# Patient Record
Sex: MALE | Race: WHITE | Employment: STUDENT | URBAN - METROPOLITAN AREA
[De-identification: names, ages, dates, MRNs, and addresses within clinical notes are randomized per-mention and may not be internally consistent; named-entity substitution may affect disease eponyms.]

---
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
## 2020-11-12 ENCOUNTER — TELEMEDICINE (OUTPATIENT)
Dept: FAMILY MEDICINE CLINIC | Facility: CLINIC | Age: 20
End: 2020-11-12
Payer: COMMERCIAL

## 2020-11-12 DIAGNOSIS — M79.10 MYALGIA: ICD-10-CM

## 2020-11-12 DIAGNOSIS — R43.2 LOSS OF TASTE: Primary | ICD-10-CM

## 2020-11-12 DIAGNOSIS — R43.0 LOSS OF SMELL: ICD-10-CM

## 2020-11-12 DIAGNOSIS — R09.81 SINUS CONGESTION: ICD-10-CM

## 2020-11-12 PROCEDURE — 99213 OFFICE O/P EST LOW 20 MIN: CPT | Performed by: PHYSICIAN ASSISTANT

## 2020-11-12 NOTE — PROGRESS NOTES
This visit is conducted using Telemedicine with live, interactive video and audio. Patient has been referred to the [facility] website at [email or web address] to review the yearly Consent to Treat document.     Patient understands and accepts financial res headaches. Negative for dizziness and light-headedness. There were no vitals taken for this visit. There is no height or weight on file to calculate BMI.   Physical Exam   Constitutional: He is oriented to person, place, and time and well-developed, to test specimen collection) should quarantine for 14 days REGARDLESS of testing.   Testing is recommended for asymptomatic close contacts at days 5-7 post-exposure, as a negative test result may help clear an individual from the recommended quarantine dede provider's office take necessary precautions to keep other people from getting infected or exposed.   Wear a facemask  You should wear a facemask when you are around other people (e.g., sharing a room or vehicle) or pets and before you enter a healthcare pr to the label instructions.  Labels contain instructions for safe and effective use of the cleaning product including precautions you should take when applying the product, such as wearing gloves and making sure you have good ventilation during use of the pr have a medical appointment, call the healthcare provider ahead of time and tell them that you have or may have COVID-19. • For medical emergencies, call 911 and notify the dispatch personnel that you have or may have COVID-19.   • Cover your mouth and nose

## 2020-11-12 NOTE — PATIENT INSTRUCTIONS
Patient Isolation Advice:  • Those with mild symptoms*, are presumed to have COVID unless they test negative and have been cleared by their physician and / or an alternate diagnosis to explain their symptoms has been established.   • Those with mild symptom smell, skin rash or discoloration of fingers or toes. **Severe symptoms include: Difficulty breathing or shortness of breath, chest pain or pressure. office. What to do if you are sick with coronavirus disease 2019 (COVID-19):   If you are sick with COV your hands and rubbing them together until they feel dry. Soap and water should be used preferentially if hands are visibly dirty.     Avoid sharing personal household items  You should not share dishes, drink glasses, cups, eating utensils, towels, or bedd self-monitoring should follow instructions provided by their local health department or occupational health professionals, as appropriate.   If you have a medical emergency and need to call 911, notify the dispatch personnel that you have, or are being eval counters, tabletops, and doorknobs. Use household cleaning sprays or wipes according to the label instructions.   Centers for Disease Control & Prevention (CDC)  What to do if you are sick with coronavirus disease 2019, [employer]-

## 2020-11-13 ENCOUNTER — LAB ENCOUNTER (OUTPATIENT)
Dept: LAB | Age: 20
End: 2020-11-13
Attending: PHYSICIAN ASSISTANT
Payer: COMMERCIAL

## 2020-11-13 DIAGNOSIS — Z20.828 EXPOSURE TO SARS-ASSOCIATED CORONAVIRUS: ICD-10-CM

## 2020-11-16 NOTE — PROGRESS NOTES
+ COVID  Pt needs to quarantine    Patient Isolation Advice:   Those with mild symptoms*, are presumed to have COVID unless they test negative and have been cleared by their physician and / or an alternate diagnosis to explain their symptoms has been establ caregiving activities. What to do if you are sick with coronavirus disease 2019 (COVID-19):   If you are sick with COVID-19, or suspect you are infected with the virus that causes COVID-19, follow the steps below to help prevent the disease from spreadin dirty. Avoid sharing personal household items  You should not share dishes, drink glasses, cups, eating utensils, towels, or bedding with other people or pets in your home. After using these items, they should be washed thoroughly with soap and water. professionals, as appropriate. If you have a medical emergency and need to call 911, notify the dispatch personnel that you have, or are being evaluated for COVID-19. If possible, put on a facemask before emergency medical services personnel arrive. Disease Control & Prevention (CDC)  What to do if you are sick with coronavirus disease 2019, [employer].IntelliDOT.pt. pdf  Centers for Disease Control & Prevention (CDC)  10 things you can do to ma

## 2020-12-02 ENCOUNTER — TELEPHONE (OUTPATIENT)
Dept: FAMILY MEDICINE CLINIC | Facility: CLINIC | Age: 20
End: 2020-12-02

## 2020-12-02 NOTE — TELEPHONE ENCOUNTER
Needs a note for work stating when he had Chinyere and also states how long he needed to quarantine for.

## 2021-11-28 ENCOUNTER — HOSPITAL ENCOUNTER (EMERGENCY)
Age: 21
Discharge: HOME | End: 2021-11-28
Attending: EMERGENCY MEDICINE
Payer: COMMERCIAL

## 2021-11-28 ENCOUNTER — APPOINTMENT (OUTPATIENT)
Dept: GENERAL RADIOLOGY | Age: 21
End: 2021-11-28
Attending: EMERGENCY MEDICINE
Payer: COMMERCIAL

## 2021-11-28 VITALS
TEMPERATURE: 98 F | WEIGHT: 200 LBS | HEART RATE: 78 BPM | OXYGEN SATURATION: 98 % | HEIGHT: 72 IN | SYSTOLIC BLOOD PRESSURE: 137 MMHG | DIASTOLIC BLOOD PRESSURE: 67 MMHG | BODY MASS INDEX: 27.09 KG/M2 | RESPIRATION RATE: 16 BRPM

## 2021-11-28 DIAGNOSIS — R07.89 CHEST PAIN, NON-CARDIAC: Primary | ICD-10-CM

## 2021-11-28 PROCEDURE — 93005 ELECTROCARDIOGRAM TRACING: CPT

## 2021-11-28 PROCEDURE — 85379 FIBRIN DEGRADATION QUANT: CPT | Performed by: EMERGENCY MEDICINE

## 2021-11-28 PROCEDURE — 93010 ELECTROCARDIOGRAM REPORT: CPT

## 2021-11-28 PROCEDURE — 85025 COMPLETE CBC W/AUTO DIFF WBC: CPT | Performed by: EMERGENCY MEDICINE

## 2021-11-28 PROCEDURE — 80053 COMPREHEN METABOLIC PANEL: CPT | Performed by: EMERGENCY MEDICINE

## 2021-11-28 PROCEDURE — 36415 COLL VENOUS BLD VENIPUNCTURE: CPT

## 2021-11-28 PROCEDURE — 99284 EMERGENCY DEPT VISIT MOD MDM: CPT

## 2021-11-28 PROCEDURE — 84484 ASSAY OF TROPONIN QUANT: CPT | Performed by: EMERGENCY MEDICINE

## 2021-11-28 PROCEDURE — 71045 X-RAY EXAM CHEST 1 VIEW: CPT | Performed by: EMERGENCY MEDICINE

## 2021-11-28 NOTE — ED PROVIDER NOTES
Patient Seen in: THE [facility] Emergency Department In [place]      History   Patient presents with:  Chest Pain Angina    Stated Complaint: chest tightness    Subjective:   HPI    Patient is a 80-year-old male presents emergency room with a history of inter Ht 182.9 cm (6')   Wt 90.7 kg   SpO2 97%   BMI 27.12 kg/m²         Physical Exam  GENERAL: Well-developed, well-nourished male sitting up breathing easily in no apparent distress. Patient is nontoxic in appearance.   HEENT: Head is normocephalic, atraumati CBC W/ DIFFERENTIAL     EKG    Rate, intervals and axes as noted on EKG Report. Rate: 79  Rhythm: Sinus Rhythm  Reading: No acute ischemic change noted              Chest x-ray is unremarkable.          MDM          1:30 patient sitting back and breathin [id number] [phone]    Call in 2 days            Medications Prescribed:  There are no discharge medications for this patient.

## 2021-11-28 NOTE — ED INITIAL ASSESSMENT (HPI)
C/O intermittent L chest pain x 6 months. States Last Sunday began having pain L neck, L eye, and L arm.

## 2021-12-14 ENCOUNTER — OFFICE VISIT (OUTPATIENT)
Dept: FAMILY MEDICINE CLINIC | Facility: CLINIC | Age: 21
End: 2021-12-14
Payer: COMMERCIAL

## 2021-12-14 VITALS
WEIGHT: 196 LBS | TEMPERATURE: 98 F | HEIGHT: 72 IN | HEART RATE: 76 BPM | OXYGEN SATURATION: 96 % | BODY MASS INDEX: 26.55 KG/M2 | DIASTOLIC BLOOD PRESSURE: 72 MMHG | RESPIRATION RATE: 16 BRPM | SYSTOLIC BLOOD PRESSURE: 120 MMHG

## 2021-12-14 DIAGNOSIS — R07.9 CHEST PAIN, UNSPECIFIED TYPE: Primary | ICD-10-CM

## 2021-12-14 DIAGNOSIS — F41.9 ANXIETY: ICD-10-CM

## 2021-12-14 PROCEDURE — 99214 OFFICE O/P EST MOD 30 MIN: CPT | Performed by: FAMILY MEDICINE

## 2021-12-14 PROCEDURE — 3008F BODY MASS INDEX DOCD: CPT | Performed by: FAMILY MEDICINE

## 2021-12-14 PROCEDURE — 3074F SYST BP LT 130 MM HG: CPT | Performed by: FAMILY MEDICINE

## 2021-12-14 PROCEDURE — 3078F DIAST BP <80 MM HG: CPT | Performed by: FAMILY MEDICINE

## 2021-12-14 RX ORDER — FLUOXETINE 10 MG/1
10 TABLET, FILM COATED ORAL DAILY
Qty: 30 TABLET | Refills: 1 | Status: SHIPPED | OUTPATIENT
Start: 2021-12-14 | End: 2022-01-11

## 2021-12-14 NOTE — PROGRESS NOTES
Subjective:   Patient ID: Dorina Diana is a 24year old male.     HPI  Mr. Laxmi Kasper is a very pleasant 26-year-old gentleman who is generally healthy who was seen at the emergency room on 11/28/2021 for left-sided chest pain apparently he has had this fo sounds. No murmur heard. Pulmonary:      Effort: Pulmonary effort is normal. No respiratory distress. Breath sounds: Normal breath sounds. No wheezing or rales. Chest:      Chest wall: No tenderness.    Abdominal:      General: Bowel sounds are

## 2021-12-14 NOTE — PATIENT INSTRUCTIONS
Thank you for choosing Torrie Henderson MD at [place] [address]  To Do: Awilda Bruce  1. Please take meds as directed. [facility] is located in [address]. Monday, Tuesday & Friday – 8 a.m. to 4 p.m.   Wednesday, Thursday – 7 a.m. to 3 p.m those potential risks and we strive to make you healthier and to improve your quality of life.     Referrals, and Radiology Information:    If your insurance requires a referral to a specialist, please allow 5 business days to process your referral request. separate you from a loved one, or lose your job. The symptoms of anxiety can be physical and mental.   How does it feel?   People with anxiety may have:  · Dizziness  · Muscle tension or pain  · Restlessness  · Sleeplessness  · Trouble focusing  · Racing he do to cope:  · Do what you can. Keep in mind that you can’t control everything. Change what you can. And let the rest take its course. · Exercise. This is a great way to ease tension and help your body feel relaxed.   · Stay away from caffeine and nicotin pain)  · Inflamed cartilage between the ribs (costochondritis)  · Fibromyalgia  · Rheumatoid arthritis  · Chest wall strain  · Reflux  · Stomach ulcer  · Spasms of the esophagus  · Gall stones  · Gallbladder inflammation  · Panic or anxiety attacks  · Emot

## 2022-01-11 NOTE — PROGRESS NOTES
Subjective:   Patient ID: Bob Dennis is a 24year old male. HPI  Mr. Ananda Christie is a pleasant 40-year-old gentleman here to follow-up for stress. I did start him on fluoxetine but it made him more anxious.   He had taken some days off and is feeling rales.   Abdominal:      General: Bowel sounds are normal.      Palpations: Abdomen is soft. Musculoskeletal:      Cervical back: Neck supple. Right lower leg: No edema. Left lower leg: No edema.    Lymphadenopathy:      Cervical: No cervical ad

## 2022-01-11 NOTE — PATIENT INSTRUCTIONS
Thank you for choosing Clarissa Box MD at [place] [address]  To Do: Vitaly Chavira  1. Please take meds as directed. [facility] is located in [address]. Monday, Tuesday & Friday – 8 a.m. to 4 p.m.   Wednesday, Thursday – 7 a.m. to 3 p.m those potential risks and we strive to make you healthier and to improve your quality of life.     Referrals, and Radiology Information:    If your insurance requires a referral to a specialist, please allow 5 business days to process your referral request.

## 2022-01-12 ENCOUNTER — LAB ENCOUNTER (OUTPATIENT)
Dept: LAB | Age: 22
End: 2022-01-12
Attending: FAMILY MEDICINE
Payer: COMMERCIAL

## 2022-01-12 DIAGNOSIS — R09.81 HEAD CONGESTION: ICD-10-CM

## 2022-01-14 LAB — SARS-COV-2 RNA RESP QL NAA+PROBE: NOT DETECTED

## 2022-10-10 ENCOUNTER — HOSPITAL ENCOUNTER (EMERGENCY)
Age: 22
Discharge: HOME | End: 2022-10-10
Attending: EMERGENCY MEDICINE
Payer: COMMERCIAL

## 2022-10-10 VITALS
SYSTOLIC BLOOD PRESSURE: 124 MMHG | RESPIRATION RATE: 18 BRPM | HEIGHT: 72 IN | TEMPERATURE: 99 F | HEART RATE: 74 BPM | DIASTOLIC BLOOD PRESSURE: 71 MMHG | WEIGHT: 200 LBS | BODY MASS INDEX: 27.09 KG/M2 | OXYGEN SATURATION: 98 %

## 2022-10-10 DIAGNOSIS — J06.9 VIRAL UPPER RESPIRATORY TRACT INFECTION: Primary | ICD-10-CM

## 2022-10-10 LAB — SARS-COV-2 RNA RESP QL NAA+PROBE: NOT DETECTED

## 2022-10-10 PROCEDURE — 99283 EMERGENCY DEPT VISIT LOW MDM: CPT

## 2022-10-10 RX ORDER — ALBUTEROL SULFATE 90 UG/1
2 AEROSOL, METERED RESPIRATORY (INHALATION) EVERY 4 HOURS PRN
Qty: 1 EACH | Refills: 0 | Status: SHIPPED | OUTPATIENT
Start: 2022-10-10 | End: 2022-11-09

## 2022-10-10 NOTE — ED INITIAL ASSESSMENT (HPI)
Pt presents to ed with c/o cough for the past week and intermittent shortness of breath. Denies fevers.

## 2024-12-27 ENCOUNTER — TELEPHONE (OUTPATIENT)
Dept: FAMILY MEDICINE CLINIC | Facility: CLINIC | Age: 24
End: 2024-12-27

## 2024-12-27 NOTE — TELEPHONE ENCOUNTER
Future Appointments   Date Time Provider Department Center   1/7/2025  3:45 PM Mynor Zapata MD [facility] [address] [facility] [address]     Patient scheduled via MyChart for:I have been getting shooting pain headaches almost every day and the occasional tightness in chest. Please advise

## 2024-12-27 NOTE — TELEPHONE ENCOUNTER
Spoke to patient.   Patient states he currently does not have symptoms. Scheduled sooner appt for Monday.   Advised to go to ER is symptoms continue/worsen. Patient verbalized understanding and thankful for call.     Future Appointments   Date Time Provider Department Center   12/30/2024  2:40 PM Roscoe Salmon APRN [facility] [address] [facility] [address]

## 2024-12-27 NOTE — TELEPHONE ENCOUNTER
Patient called back.   States sx started beginning of this week ~5 days ago.  Has had episodes of shooting pain headache with chest tightness that last 5 minutes.   Has had a couple episodes a day but occur mostly at night.   Has had intermittent dizziness and SOB during some episodes.   Last episode occurred last night.     Was sick with a head cold/congestion about 2 weeks.   Has not had more stress than usual.     Currently denies any symptoms.   Advised patient to go to ER if sx worsen or has chest pain/sob. Keep hydrated. Patient verbalized understanding.     Ok to use double book at 3 pm on Monday 12/30? Patient is unavailable in morning.

## 2024-12-27 NOTE — TELEPHONE ENCOUNTER
Might be good to have him evaluated through the emergency room.  I have not seen him in about 3 years.

## 2025-01-10 ENCOUNTER — OFFICE VISIT (OUTPATIENT)
Dept: FAMILY MEDICINE CLINIC | Facility: CLINIC | Age: 25
End: 2025-01-10
Payer: COMMERCIAL

## 2025-01-10 VITALS
TEMPERATURE: 97 F | WEIGHT: 213 LBS | SYSTOLIC BLOOD PRESSURE: 120 MMHG | OXYGEN SATURATION: 98 % | DIASTOLIC BLOOD PRESSURE: 66 MMHG | BODY MASS INDEX: 28.85 KG/M2 | HEIGHT: 72 IN | RESPIRATION RATE: 16 BRPM | HEART RATE: 69 BPM

## 2025-01-10 DIAGNOSIS — Z13.0 SCREENING FOR ENDOCRINE, METABOLIC AND IMMUNITY DISORDER: ICD-10-CM

## 2025-01-10 DIAGNOSIS — R07.9 CHEST PAIN, UNSPECIFIED TYPE: ICD-10-CM

## 2025-01-10 DIAGNOSIS — Z13.29 SCREENING FOR ENDOCRINE, METABOLIC AND IMMUNITY DISORDER: ICD-10-CM

## 2025-01-10 DIAGNOSIS — R51.9 NONINTRACTABLE HEADACHE, UNSPECIFIED CHRONICITY PATTERN, UNSPECIFIED HEADACHE TYPE: Primary | ICD-10-CM

## 2025-01-10 DIAGNOSIS — Z13.228 SCREENING FOR ENDOCRINE, METABOLIC AND IMMUNITY DISORDER: ICD-10-CM

## 2025-01-10 DIAGNOSIS — Z23 NEED FOR IMMUNIZATION AGAINST INFLUENZA: ICD-10-CM

## 2025-01-10 PROCEDURE — 90656 IIV3 VACC NO PRSV 0.5 ML IM: CPT | Performed by: FAMILY MEDICINE

## 2025-01-10 PROCEDURE — G2211 COMPLEX E/M VISIT ADD ON: HCPCS | Performed by: FAMILY MEDICINE

## 2025-01-10 PROCEDURE — 90471 IMMUNIZATION ADMIN: CPT | Performed by: FAMILY MEDICINE

## 2025-01-10 PROCEDURE — 99214 OFFICE O/P EST MOD 30 MIN: CPT | Performed by: FAMILY MEDICINE

## 2025-01-10 PROCEDURE — 3008F BODY MASS INDEX DOCD: CPT | Performed by: FAMILY MEDICINE

## 2025-01-10 PROCEDURE — 3074F SYST BP LT 130 MM HG: CPT | Performed by: FAMILY MEDICINE

## 2025-01-10 PROCEDURE — 3078F DIAST BP <80 MM HG: CPT | Performed by: FAMILY MEDICINE

## 2025-01-10 NOTE — PATIENT INSTRUCTIONS
Thank you for choosing Mynor Zapata MD at [facility]  To Do: TorinBROOKE Dobbins  1. Please see below   Call [phone] to schedule the appointment.   Please signup for [employer], which is electronic access to your record if you have not done so.  All your results will post on there.  [email or web address]   You can NOW use [employer] to book your appointments with us, or consider using open access scheduling which is through the [place] website [email or web address] and type in Mynor Zapata MD and follow the links for \"Schedule Online Now\"    To schedule Imaging or tests at [place] call Central Scheduling [phone], Go to [facility] A ER Building (For example: CT scans, X rays, Ultrasound, MRI)  Cardiac Testing in ER building Building A second floor Cardiac Testing [phone] (For example: Holter Monitor, Cardiac Stress tests,Event Monitor, or 2D Echocardiograms)  [facility] call [phone] usually in [facility] A  Walk in Clinic in [place] at [address] Mon-Fri at 8am-7:30 p.m., and Sat/Sun 9:00a.m.-4:30 p.m.  Also at [address] [place] [address]  Call [phone] for info     Please call our office about any questions regarding your treatment/medicines/tests as a result of today's visit.  For your safety, read the entire package insert of all medicines prescribed to you and be aware of all of the risks of treatment even beyond those discussed today.  All therapies have potential risk of harm or side effects or medication interactions.  It is your duty and for your safety to discuss with the pharmacist and our office with questions, and to notify us and stop treatment if problems arise, but know that our intention is that the benefits outweigh those potential risks and we strive to make you healthier and to improve your quality of life.    Referrals, and Radiology Information:    If your insurance requires a referral to a specialist, please allow 5 business days to process  your referral request.    If Mynor Zapata MD orders a CT or MRI, it may take up to 10 business days to receive approval from your insurance company. Once our office has called informing you that the insurance company approved your testing, please call Central Scheduling at [phone]  Please allow our office 5 business days to contact you regarding any testing results.    Refill policies:   Allow 3 business days for refills; controlled substances may take longer and must be picked up from the office in person.  Narcotic medications can only be filled in 30 day increments and must be refilled at an office visit only.  If your prescription is due for a refill, you may be due for a follow-up appointment.  We cannot refill your maintenance medications at a preventative wellness visit.  To best provide you care, patients receiving maintenance medications need to be seen at least twice a year.

## 2025-01-10 NOTE — PROGRESS NOTES
Subjective:   Patient ID: Vimal Dobbins is a 24 year old male.    HPI  Mr. Dobbins is a pleasant 23 y/o M with h/o asthma  Here today for left-sided chest pain which he has had in the past which seem to come and go but seem to be more frequent.  Pain is usually aching sharp.  He does not have it now.  However a few weeks ago he also has had posterior headaches which also went away.  He is a  and also a musician.  This is mostly where his stress is coming from.  No fever no shortness of breath no nausea no vomiting no abdominal pain no constipation.  He does not report headaches, dizziness.      I had reviewed past medical and family histories together with allergy and medication lists documented.    History/Other:   Review of Systems   Constitutional:  Negative for fatigue, fever and unexpected weight change.   HENT:  Negative for sore throat and trouble swallowing.    Respiratory:  Negative for cough and shortness of breath.    Gastrointestinal:  Negative for abdominal pain, diarrhea, nausea and vomiting.   Genitourinary:  Negative for difficulty urinating.     Current Outpatient Medications   Medication Sig Dispense Refill    Albuterol Sulfate 108 (90 Base) MCG/ACT Inhalation Aerosol Powder, Breath Activated Inhale 90 mcg into the lungs every 4 to 6 hours as needed.       Allergies:Allergies[1]    Objective:   Physical Exam  Vitals reviewed.   Constitutional:       General: He is not in acute distress.  HENT:      Right Ear: Tympanic membrane, ear canal and external ear normal.      Left Ear: Tympanic membrane, ear canal and external ear normal.      Nose: Nose normal. No congestion or rhinorrhea.      Mouth/Throat:      Mouth: Mucous membranes are moist.      Pharynx: Oropharynx is clear. No oropharyngeal exudate or posterior oropharyngeal erythema.   Eyes:      General: No scleral icterus.        Right eye: No discharge.         Left eye: No discharge.      Conjunctiva/sclera: Conjunctivae normal.    Cardiovascular:      Rate and Rhythm: Normal rate and regular rhythm.      Heart sounds: Normal heart sounds. No murmur heard.  Pulmonary:      Effort: Pulmonary effort is normal. No respiratory distress.      Breath sounds: Normal breath sounds. No wheezing or rales.   Abdominal:      General: Bowel sounds are normal. There is no distension.      Palpations: Abdomen is soft.      Tenderness: There is no abdominal tenderness.   Musculoskeletal:      Cervical back: Neck supple.      Right lower leg: No edema.      Left lower leg: No edema.   Lymphadenopathy:      Cervical: No cervical adenopathy.   Skin:     General: Skin is warm.   Neurological:      General: No focal deficit present.      Mental Status: He is alert.   Psychiatric:         Mood and Affect: Mood normal.         Behavior: Behavior normal.         Assessment & Plan:   1. Nonintractable headache, unspecified chronicity pattern, unspecified headache type   -Resolved likely tension headaches  - Will monitor for recurrence   2. Need for immunization against influenza    3. Screening for endocrine, metabolic and immunity disorder    4. Chest pain, unspecified type   -Musculoskeletal versus stress  - We did agree for him to do a stress test  - He does have family history of heart disease  - He did quit smoking cigarettes a while ago.     This note was prepared using Dragon Medical voice recognition dictation software. As a result errors may occur. When identified these errors have been corrected. While every attempt is made to correct errors during dictation discrepancies may still exist          Orders Placed This Encounter   Procedures    CBC W Differential W Platelet [E]    Comp Metabolic Panel (14) [E]    Lipid Panel [E]    TSH and Free T4 [E]    INFLUENZA VACCINE, TRI, PRESERV FREE, 0.5 ML         This note was prepared using Dragon Medical voice recognition dictation software. As a result errors may occur. When identified these errors have been  corrected. While every attempt is made to correct errors during dictation discrepancies may still exist            Meds This Visit:  Requested Prescriptions      No prescriptions requested or ordered in this encounter       Imaging & Referrals:  INFLUENZA VACCINE, TRI, PRESERV FREE, 0.5 ML  CARD TREADMILL STRESS, ADULT (EWL=[id number])         [1] No Known Allergies

## 2025-03-27 ENCOUNTER — HOSPITAL ENCOUNTER (OUTPATIENT)
Dept: CV DIAGNOSTICS | Age: 25
Discharge: HOME | End: 2025-03-27
Attending: FAMILY MEDICINE
Payer: COMMERCIAL

## 2025-03-27 DIAGNOSIS — R07.9 CHEST PAIN, UNSPECIFIED TYPE: ICD-10-CM

## 2025-03-27 PROCEDURE — 93018 CV STRESS TEST I&R ONLY: CPT | Performed by: FAMILY MEDICINE

## 2025-03-27 PROCEDURE — 93017 CV STRESS TEST TRACING ONLY: CPT | Performed by: FAMILY MEDICINE

## 2025-07-14 ENCOUNTER — OFFICE VISIT (OUTPATIENT)
Dept: FAMILY MEDICINE CLINIC | Facility: CLINIC | Age: 25
End: 2025-07-14
Payer: COMMERCIAL

## 2025-07-14 VITALS
WEIGHT: 211 LBS | HEART RATE: 66 BPM | RESPIRATION RATE: 16 BRPM | TEMPERATURE: 98 F | BODY MASS INDEX: 28.58 KG/M2 | OXYGEN SATURATION: 98 % | HEIGHT: 72 IN | DIASTOLIC BLOOD PRESSURE: 70 MMHG | SYSTOLIC BLOOD PRESSURE: 118 MMHG

## 2025-07-14 DIAGNOSIS — R00.2 PALPITATION: Primary | ICD-10-CM

## 2025-07-14 PROCEDURE — 99214 OFFICE O/P EST MOD 30 MIN: CPT | Performed by: FAMILY MEDICINE

## 2025-07-14 PROCEDURE — G2211 COMPLEX E/M VISIT ADD ON: HCPCS | Performed by: FAMILY MEDICINE

## 2025-07-14 PROCEDURE — 3074F SYST BP LT 130 MM HG: CPT | Performed by: FAMILY MEDICINE

## 2025-07-14 PROCEDURE — 3008F BODY MASS INDEX DOCD: CPT | Performed by: FAMILY MEDICINE

## 2025-07-14 PROCEDURE — 3078F DIAST BP <80 MM HG: CPT | Performed by: FAMILY MEDICINE

## 2025-07-14 NOTE — PROGRESS NOTES
Subjective:   Patient ID: Vimal Dobbins is a 24 year old male.    GEOVANNA Aguilera is a very pleasant 24-year-old male with history of asthma presenting today for follow-up after he was seen in the emergency room at [facility] last Monday for chest pain and elevated heart rate.  He had felt his heart go fast and had to ask and borrowed his dad's Apple Watch and was noted to have heart rate of 140s.  He was not in any stress.  This occurred while he was resting.  This is associated with numbness and tingling in both hands associated with dizziness and lightheadedness.  He drinks 1 cup of coffee a day.  He had stopped drinking energy drinks.  Back in January when I saw him he had stress test done which was essentially normal.  Workup at the emergency room was essentially normal as per patient.  He currently does not report symptoms.    I had reviewed past medical and family histories together with allergy and medication lists documented.    History/Other:   Review of Systems   Constitutional:  Negative for fatigue and fever.   Respiratory:  Positive for shortness of breath.    Cardiovascular:  Positive for chest pain and palpitations.   Gastrointestinal:  Negative for abdominal pain, nausea and vomiting.   Neurological:  Positive for dizziness, light-headedness and numbness. Negative for syncope, weakness and headaches.     Current Medications[1]  Allergies:Allergies[2]    Objective:   Physical Exam  Vitals reviewed.   Constitutional:       General: He is not in acute distress.  HENT:      Mouth/Throat:      Mouth: Mucous membranes are moist.      Pharynx: Oropharynx is clear.   Eyes:      General: No scleral icterus.     Conjunctiva/sclera: Conjunctivae normal.   Cardiovascular:      Rate and Rhythm: Normal rate and regular rhythm.      Heart sounds: Normal heart sounds. No murmur heard.  Pulmonary:      Effort: Pulmonary effort is normal. No respiratory distress.      Breath sounds: Normal breath sounds. No  wheezing.   Abdominal:      General: Bowel sounds are normal.      Palpations: Abdomen is soft.   Musculoskeletal:      Cervical back: Neck supple.      Right lower leg: No edema.      Left lower leg: No edema.   Lymphadenopathy:      Cervical: No cervical adenopathy.   Skin:     General: Skin is warm.   Neurological:      General: No focal deficit present.      Mental Status: He is alert.   Psychiatric:         Mood and Affect: Mood normal.         Behavior: Behavior normal.         Thought Content: Thought content normal.         Assessment & Plan:   1. Palpitation    -Unclear as to etiology  - Cardiac versus psychological  - Will order Holter monitor and echocardiogram  - Will refer to cardiology  - Go to the emergency room if symptoms recur  - Try to avoid foods or drinks that tend to stimulate thyroid such as too much caffeine or energy drinks  - Will monitor at this point  - Will also check his labs including CBC, CMP and TSH  -Follow-up in 4 weeks      This note was prepared using Dragon Medical voice recognition dictation software. As a result errors may occur. When identified these errors have been corrected. While every attempt is made to correct errors during dictation discrepancies may still exist            No orders of the defined types were placed in this encounter.      Meds This Visit:  Requested Prescriptions      No prescriptions requested or ordered in this encounter       Imaging & Referrals:  CARDIO - INTERNAL  CARD ECHO 2D DOPPLER (CPT=93306)  CARD ZIO EXTENDED MONITOR 8-14 DAYS (CPT=93246/94197)         [1]   Current Outpatient Medications   Medication Sig Dispense Refill    Albuterol Sulfate 108 (90 Base) MCG/ACT Inhalation Aerosol Powder, Breath Activated Inhale 90 mcg into the lungs every 4 to 6 hours as needed. 1 each 1   [2] No Known Allergies

## 2025-07-14 NOTE — PATIENT INSTRUCTIONS
Thank you for choosing Mynor Zapata MD at [facility]  To Do: TorinBROOKE Dobbins  1. Please see below   Call [phone] to schedule the appointment.   Please signup for [employer], which is electronic access to your record if you have not done so.  All your results will post on there.  [email or web address]   You can NOW use [employer] to book your appointments with us, or consider using open access scheduling which is through the [place] website [email or web address] and type in Mynor Zapata MD and follow the links for \"Schedule Online Now\"    To schedule Imaging or tests at [place] call Central Scheduling [phone], Go to [facility] A ER Building (For example: CT scans, X rays, Ultrasound, MRI)  Cardiac Testing in ER building Building A second floor Cardiac Testing [phone] (For example: Holter Monitor, Cardiac Stress tests,Event Monitor, or 2D Echocardiograms)  [facility] call [phone] usually in [facility] A  Walk in Clinic in [place] at [address] Mon-Fri at 8am-7:30 p.m., and Sat/Sun 9:00a.m.-4:30 p.m.  Also at [address] [place] [address]  Call [phone] for info     Please call our office about any questions regarding your treatment/medicines/tests as a result of today's visit.  For your safety, read the entire package insert of all medicines prescribed to you and be aware of all of the risks of treatment even beyond those discussed today.  All therapies have potential risk of harm or side effects or medication interactions.  It is your duty and for your safety to discuss with the pharmacist and our office with questions, and to notify us and stop treatment if problems arise, but know that our intention is that the benefits outweigh those potential risks and we strive to make you healthier and to improve your quality of life.    Referrals, and Radiology Information:    If your insurance requires a referral to a specialist, please allow 5 business days to process  your referral request.    If Mynor Zapata MD orders a CT or MRI, it may take up to 10 business days to receive approval from your insurance company. Once our office has called informing you that the insurance company approved your testing, please call Central Scheduling at [phone]  Please allow our office 5 business days to contact you regarding any testing results.    Refill policies:   Allow 3 business days for refills; controlled substances may take longer and must be picked up from the office in person.  Narcotic medications can only be filled in 30 day increments and must be refilled at an office visit only.  If your prescription is due for a refill, you may be due for a follow-up appointment.  We cannot refill your maintenance medications at a preventative wellness visit.  To best provide you care, patients receiving maintenance medications need to be seen at least twice a year.

## (undated) NOTE — LETTER
Date & Time: 10/22/2018, 5:17 PM  Patient: Dorina Diana  Encounter Provider(s): MD Wendi Grover PA       To Whom It May Concern:    Brandon Velazquez was seen and treated in our department on 10/22/2018.  He may return to school t

## (undated) NOTE — LETTER
01/10/25  Willy Dobbins  [address] [address]     Dear Willy,  [facility] needs to inform you that you have not kept your healthcare appointments.  Our policy is to notify you when we begin to see a repeated pattern.  It is very important for you to keep all your future appointments.   Our records show that you have not attended your appointments or cancelled with less than 24 hours on the following dates:  12/30/24 and 1/2/25.  It is the policy of the [facility] to terminate a physician-patient relationship for repeated failure to keep appointments.  Please be advised that should this continue; we may begin the dismissal process for future medical care from [facility].  We value the relationship we have established with you.  We hope to continue to serve your health care needs.   Thank you for your understanding.  The Office of Dr. Mynor Zapata MD

## (undated) NOTE — LETTER
Date: 10/23/2018    Patient Name: Alda Vicente          To Whom it may concern: This letter has been written at the patient's request. The above patient was seen at the [facility] for treatment of a medical condition.     Tayler Moeller is cleared

## (undated) NOTE — LETTER
ASTHMA ACTION PLAN for Vimal Dobbins     : 2000     Date: 1/10/2025  Provider:  Mynor Zapata MD  Phone for doctor or clinic: [facility], [address] [place] [id number] [phone]    ACT Score: 20      You can use the colors of a traffic light to help learn about your asthma medicines.      1. Green - Go! % of Personal Best Peak Flow Use controller medicine.   Breathing is good  No cough or wheeze  Can work and play Medicine How much to take When to take it          2. Yellow - Caution. 50-79% Personal Best Peak  Flow.  Use reliever medicine to keep an asthma attack from getting bad.   Cough  Wheezing  Tight Chest  Wake up at night Medicine How much to take When to take it    Albuterol inhaler,  2 puffs every four hours as needed.         Additional instructions         3. Red - Stop! Danger!  <50% Personal Best Peak  Flow. Take these medications until  Get help from a doctor   Medicine not helping  Breathing is hard and fast  Nose opens wide  Can't walk  Ribs show  Can't talk well Medicine How much to take When to take it    Take albuterol 2 puffs every 15 minutes and go to the ER or call 911       Additional Instructions If your symptoms do not improve and you cannot contact your doctor, [facility] or call 911 immediately!     [x] Asthma Action Plan reviewed with patient (and caregiver if necessary) and a copy of the plan was given to the patient/caregiver.   [] Asthma Action Plan reviewed with patient (and caregiver if necessary) on the phone and mailed copy to patient or submitted via [employer].     Signatures:  Provider  Mynor Zapata MD   Patient Caretaker

## (undated) NOTE — MR AVS SNAPSHOT
[facility] [address]  [facility]  [phone]               Thank you for choosing us for your health care visit with WILLIAMS Benson.   We are glad to serve you and happy to provide you •To schedule Imaging or tests at [facility] Scheduling [phone], Go to [facility] A ER Building (For example: CT scans, X rays, Ultrasound, MRI)  •Cardiac Testing in ER building Building A second floor Cardiac Testing [phone] (For example:  [employer] Narcotic medications can only be filled in 30 day increments and must be refilled at an office visit only. If your prescription is due for a refill, you may be due for a follow-up appointment.   We cannot refill your maintenance medications at a preventati information on getting   Proxy Access to your child’s MyChart go to [email or web address] and click on the   Sign Up Forms link in the Additional Information box on the right. MyChart Questions? Call [phone] for help.   MyChart is NOT to

## (undated) NOTE — LETTER
ASTHMA ACTION PLAN for Jerry Hoffman     : 2000     Date: 10/23/2018  Provider:  Dana Lambert MD  Phone for doctor or clinic: [facility], [address] [facility] , [address]  [phone]

## (undated) NOTE — Clinical Note
05/17/2017    Preoperative History and Physical Internal Medicine    CC: Patient presents with:    chronic tonsillitis       Paco Enciso is 12year old presenting for a preoperative exam.    This patient is having surgery on date: 6/6/17 for procedure: Constitutional: Negative for activity change, appetite change, fatigue and unexpected weight change. HENT: Positive for sinus pressure and sore throat.  Negative for congestion, ear pain, hearing loss and trouble swallowing.    Eyes: Negative for pain and Eyes: Conjunctivae are normal. Pupils are equal, round, and reactive to light. Right eye exhibits no discharge. Left eye exhibits no discharge. No scleral icterus. Neck: Normal range of motion. No thyromegaly present.    Cardiovascular: Normal rate and re Cardiac Risk Assessment (Elevated Cardiac risk greater than 1% chance of cardiac death or MI by RCRI is defined as having more than 1 of the following: High risk vascular surgery, CAD, h/o stroke, Insulin Dependant DM, CHF, CKD Cr>2) low    Functional Stat

## (undated) NOTE — MR AVS SNAPSHOT
[address] [place] [facility] [id number] [phone]               Thank you for choosing us for your health care visit with WILLIAMS Infante.   We are glad to serve you and happy to provide you with this summary of your promote drainage from the sinuses. · Over-the-counter decongestants may be used unless a similar medicine was prescribed. Nasal sprays work the fastest. Use one that contains phenylephrine or oxymetazoline. First blow the nose gently. Then use the spray. [address]166. All rights reserved. This information is not intended as a substitute for professional medical care. Always follow your healthcare professional's instructions. Follow Up with Our Office     Return if symptoms worsen or fail to improve. Referral Information     Referral Order Referred to [address] [place] [address] Masood Bellamy Phone Visits Status Diagnosis           Sore throat [88803]           Sore throat [97966]                 Results of Recent Testing     STREP A ASSAY W/OPTIC      Component Value Standard Ran o Be role models themselves by making healthy eating and daily physical activity the norm for their family.   o Create a home where healthy choices are available and encouraged  o Make it fun – find ways to engage your children such as:  o playing a game of

## (undated) NOTE — Clinical Note
ASTHMA ACTION PLAN for Katya Rodriguez     : 2000     Date: 2017  Provider:  WILLIAMS Davalos  Phone for doctor or clinic: [facility], [address] [facility] , [address] [address] [facility]  [phone]

## (undated) NOTE — MR AVS SNAPSHOT
[facility] [address]  [address] [facility]  [phone]               Thank you for choosing us for your health care visit with Kari Finn MD.  We are glad to serve you and happy to provide you with t Assoc Dx:  Chronic frontal sinusitis [J32.1]          Reason for Today's Visit     Asthma     Sore Throat           Medical Issues Discussed Today     Asthma    Chronic frontal sinusitis          Instructions and Information about Your Health    Thank you interactions.  It is your duty and for your safety to discuss with the pharmacist and our office with questions, and to notify us and stop treatment if problems arise, but know that our intention is that the benefits outweigh those potential risks and we s * albuterol sulfate (2.5 MG/3ML) 0.083% Nebu   Take 3 mL (2.5 mg total) by nebulization every 6 (six) hours as needed for Wheezing.    Commonly known as:  VENTOLIN           * Albuterol Sulfate  (90 Base) MCG/ACT Aers   Inhale 1 puff into the lungs Call [phone] for help. AUPEO!t is NOT to be used for urgent needs. For medical emergencies, dial 911.                Visit [facility] online at  [email or web address]

## (undated) NOTE — LETTER
Date: 12/2/2020    Patient Name: Neha Bocanegra          To Whom it may concern: This letter has been written at the patient's request. The above patient was seen at the [facility] for treatment of a medical condition.     This patient rose

## (undated) NOTE — MR AVS SNAPSHOT
[facility] [address]  [address] [facility]  [phone]               Thank you for choosing us for your health care visit with Hendrick Moritz, MD.  We are glad to serve you and happy to provide you with t Reason for Today's Visit     Establish Care     Asthma     Sore Throat     Headache     Cough           Medical Issues Discussed Today     Asthma    Acute non-recurrent frontal sinusitis    -  Primary    Chronic tonsillitis        Cough        Gabriel Dudley aware of all of the risks of treatment even beyond those discussed today.  All therapies have potential risk of harm or side effects or medication interactions.  It is your duty and for your safety to discuss with the pharmacist and our office with oskar This list is accurate as of: 2/21/17  4:51 PM.  Always use your most recent med list.                * albuterol sulfate (2.5 MG/3ML) 0.083% Nebu   Take 3 mL (2.5 mg total) by nebulization every 6 (six) hours as needed for Wheezing.    What changed: Visit [facility] online at  [email or web address]